# Patient Record
Sex: MALE | Race: WHITE | Employment: STUDENT | URBAN - METROPOLITAN AREA
[De-identification: names, ages, dates, MRNs, and addresses within clinical notes are randomized per-mention and may not be internally consistent; named-entity substitution may affect disease eponyms.]

---
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
## 2021-02-23 ENCOUNTER — OFFICE VISIT (OUTPATIENT)
Dept: FAMILY MEDICINE CLINIC | Facility: CLINIC | Age: 21
End: 2021-02-23
Payer: COMMERCIAL

## 2021-02-23 VITALS
RESPIRATION RATE: 16 BRPM | HEART RATE: 64 BPM | SYSTOLIC BLOOD PRESSURE: 112 MMHG | WEIGHT: 139 LBS | HEIGHT: 64.5 IN | TEMPERATURE: 97 F | BODY MASS INDEX: 23.44 KG/M2 | DIASTOLIC BLOOD PRESSURE: 70 MMHG | OXYGEN SATURATION: 99 %

## 2021-02-23 DIAGNOSIS — Z00.00 WELLNESS EXAMINATION: Primary | ICD-10-CM

## 2021-02-23 PROCEDURE — 3074F SYST BP LT 130 MM HG: CPT | Performed by: FAMILY MEDICINE

## 2021-02-23 PROCEDURE — 99395 PREV VISIT EST AGE 18-39: CPT | Performed by: FAMILY MEDICINE

## 2021-02-23 PROCEDURE — 3078F DIAST BP <80 MM HG: CPT | Performed by: FAMILY MEDICINE

## 2021-02-23 PROCEDURE — 3008F BODY MASS INDEX DOCD: CPT | Performed by: FAMILY MEDICINE

## 2021-02-23 NOTE — PROGRESS NOTES
Wellness Exam    REASON FOR VISIT:    Shahnaz Hartman is a 22year old male who presents for an [address]     Current Complaints: Mr. Carlos Hoffman is here for his wellness exam  Flu Shot: see immunization record  Health Maintenance Topics with d PREVENTATIVE SERVICES  INDICATIONS AND SCHEDULE Recommendation Internal Lab or Procedure External Lab or Procedure   Colonoscopy Screen Every 10 years There are no preventive care reminders to display for this patient.     Flex Sigmoidoscopy Screen  Morenita Family History   Problem Relation Age of Onset   • Heart Disorder Father         leaky aortic heart valve   • No Known Problems Mother       SOCIAL HISTORY:   Social History    Tobacco Use      Smoking status: Never Smoker      Smokeless tobacco: Never U rales.   Abdominal: Soft. Bowel sounds are normal. There is no tenderness. Musculoskeletal: Normal range of motion. He exhibits no edema. Lymphadenopathy:     He has no cervical adenopathy.    Neurological: He is alert and oriented to person, place, and History   Administered Date(s) Administered   • DTAP 04/13/2001, 06/15/2001, 08/14/2001, 04/04/2006   • FLULAVAL 6 months & older 0.5 ml Prefilled syringe (33075) 10/28/2019   • HEP A,Ped/Adol,(2 Dose) 04/04/2006, 02/15/2007   • HEP B, Ped/Adol 04/13/2001,

## 2021-02-23 NOTE — PATIENT INSTRUCTIONS
Thank you for choosing Isacc Frias MD at [place] [address]  To Do: Sarah Mccullough  1. Please see age appropriate health prevention below  [facility] is located in [address]. Monday, Tuesday & Friday – 8 a.m. to 4 p.m.   Wednesday, Thursd the benefits outweigh those potential risks and we strive to make you healthier and to improve your quality of life.     Referrals, and Radiology Information:    If your insurance requires a referral to a specialist, please allow 5 business days to process exams   Blood pressure All men in this age group Yearly checkup if your blood pressure is normal  Normal blood pressure is less than 120/80  If your blood pressure is higher than normal, follow the advice of your healthcare provider.     Diabetes mellitus, given 6 months after the first dose    Influenza (flu) All men in this age group Once a year   Measles, mumps, rubella (MMR) All men in this age group who have no record of these infections or vaccines 1 or 2 doses through age 54   Meningococcal [facility]

## 2021-04-06 ENCOUNTER — TELEPHONE (OUTPATIENT)
Dept: FAMILY MEDICINE CLINIC | Facility: CLINIC | Age: 21
End: 2021-04-06

## 2021-04-07 ENCOUNTER — OFFICE VISIT (OUTPATIENT)
Dept: FAMILY MEDICINE CLINIC | Facility: CLINIC | Age: 21
End: 2021-04-07
Payer: COMMERCIAL

## 2021-04-07 ENCOUNTER — HOSPITAL ENCOUNTER (OUTPATIENT)
Dept: ULTRASOUND IMAGING | Age: 21
Discharge: HOME | End: 2021-04-07
Attending: FAMILY MEDICINE
Payer: COMMERCIAL

## 2021-04-07 VITALS
RESPIRATION RATE: 14 BRPM | HEART RATE: 110 BPM | BODY MASS INDEX: 23.61 KG/M2 | SYSTOLIC BLOOD PRESSURE: 120 MMHG | HEIGHT: 64.5 IN | DIASTOLIC BLOOD PRESSURE: 70 MMHG | OXYGEN SATURATION: 98 % | TEMPERATURE: 98 F | WEIGHT: 140 LBS

## 2021-04-07 DIAGNOSIS — N50.811 PAIN IN RIGHT TESTICLE: ICD-10-CM

## 2021-04-07 DIAGNOSIS — N50.811 PAIN IN RIGHT TESTICLE: Primary | ICD-10-CM

## 2021-04-07 PROCEDURE — 3074F SYST BP LT 130 MM HG: CPT | Performed by: FAMILY MEDICINE

## 2021-04-07 PROCEDURE — 76870 US EXAM SCROTUM: CPT | Performed by: FAMILY MEDICINE

## 2021-04-07 PROCEDURE — 99213 OFFICE O/P EST LOW 20 MIN: CPT | Performed by: FAMILY MEDICINE

## 2021-04-07 PROCEDURE — 93975 VASCULAR STUDY: CPT | Performed by: FAMILY MEDICINE

## 2021-04-07 PROCEDURE — 3008F BODY MASS INDEX DOCD: CPT | Performed by: FAMILY MEDICINE

## 2021-04-07 PROCEDURE — 3078F DIAST BP <80 MM HG: CPT | Performed by: FAMILY MEDICINE

## 2021-04-07 NOTE — PROGRESS NOTES
HPI/Subjective:   Patient ID: Linda Galeana is a 22year old male. HPI  Ashley Kenney has been generally healthy here today for right testicular pain for a month. He described the pain as sharp and mild to moderate intensity and nonradiating.   This seem to mass or tenderness. Lymphadenopathy:      Lower Body: No right inguinal adenopathy. No left inguinal adenopathy. Neurological:      Mental Status: He is alert.          Assessment & Plan:   Pain in right testicle  (primary encounter diagnosis)  Uncl

## 2021-04-07 NOTE — PATIENT INSTRUCTIONS
Thank you for choosing Dolly Diaz MD at [place] [address]  To Do: Mario Anderson  1. Please have test as ordered  [facility] is located in [address]. Monday, Tuesday & Friday – 8 a.m. to 4 p.m. Wednesday, Thursday – 7 a.m. to 3 p.m. those potential risks and we strive to make you healthier and to improve your quality of life.     Referrals, and Radiology Information:    If your insurance requires a referral to a specialist, please allow 5 business days to process your referral request. all medicine as directed. The following are general care guidelines:  · To relieve pain and swelling, apply an ice pack wrapped in a thin towel for 10 minutes at a time. Continue this on and off for 1 to 2 days.   · When lying down, place a small rolled to

## 2021-04-28 ENCOUNTER — IMMUNIZATION (OUTPATIENT)
Dept: LAB | Age: 21
End: 2021-04-28
Attending: HOSPITALIST
Payer: COMMERCIAL

## 2021-04-28 DIAGNOSIS — Z23 NEED FOR VACCINATION: Primary | ICD-10-CM

## 2021-04-28 PROCEDURE — 0001A SARSCOV2 VAC 30MCG/0.3ML IM: CPT

## 2021-05-20 ENCOUNTER — IMMUNIZATION (OUTPATIENT)
Dept: LAB | Age: 21
End: 2021-05-20
Attending: HOSPITALIST
Payer: COMMERCIAL

## 2021-05-20 DIAGNOSIS — Z23 NEED FOR VACCINATION: Primary | ICD-10-CM

## 2021-05-20 PROCEDURE — 0002A SARSCOV2 VAC 30MCG/0.3ML IM: CPT

## 2021-06-07 RX ORDER — MONTELUKAST SODIUM 10 MG/1
10 TABLET ORAL NIGHTLY
Qty: 90 TABLET | Refills: 1 | Status: SHIPPED | OUTPATIENT
Start: 2021-06-07

## 2021-06-07 NOTE — TELEPHONE ENCOUNTER
Asthma & COPD Medication Protocol [id number]/07/2021 02:26 PM   Asthma Action Score greater than or equal to 20    AAP/ACT given in last 12 months    Appointment in past 6 or next 3 months      Refill protocol failed because the patient did not meet the prot

## 2021-09-07 ENCOUNTER — HOSPITAL ENCOUNTER (OUTPATIENT)
Facility: HOSPITAL | Age: 21
Setting detail: OBSERVATION
Discharge: HOME | End: 2021-09-07
Attending: HOSPITALIST | Admitting: HOSPITALIST
Payer: COMMERCIAL

## 2021-09-07 ENCOUNTER — ANESTHESIA (OUTPATIENT)
Dept: SURGERY | Facility: HOSPITAL | Age: 21
End: 2021-09-07
Payer: COMMERCIAL

## 2021-09-07 ENCOUNTER — HOSPITAL ENCOUNTER (OUTPATIENT)
Age: 21
Discharge: ANOTHER HOSPITAL | End: 2021-09-07
Payer: COMMERCIAL

## 2021-09-07 ENCOUNTER — APPOINTMENT (OUTPATIENT)
Dept: CT IMAGING | Age: 21
End: 2021-09-07
Attending: PHYSICIAN ASSISTANT
Payer: COMMERCIAL

## 2021-09-07 ENCOUNTER — ANESTHESIA EVENT (OUTPATIENT)
Dept: SURGERY | Facility: HOSPITAL | Age: 21
End: 2021-09-07
Payer: COMMERCIAL

## 2021-09-07 VITALS
HEIGHT: 65 IN | RESPIRATION RATE: 18 BRPM | WEIGHT: 137 LBS | DIASTOLIC BLOOD PRESSURE: 80 MMHG | HEART RATE: 85 BPM | OXYGEN SATURATION: 99 % | SYSTOLIC BLOOD PRESSURE: 135 MMHG | BODY MASS INDEX: 22.82 KG/M2 | TEMPERATURE: 98 F

## 2021-09-07 VITALS
TEMPERATURE: 99 F | OXYGEN SATURATION: 96 % | HEART RATE: 106 BPM | DIASTOLIC BLOOD PRESSURE: 70 MMHG | SYSTOLIC BLOOD PRESSURE: 131 MMHG | RESPIRATION RATE: 18 BRPM

## 2021-09-07 DIAGNOSIS — K35.80 ACUTE APPENDICITIS, UNSPECIFIED ACUTE APPENDICITIS TYPE: Primary | ICD-10-CM

## 2021-09-07 DIAGNOSIS — K37 APPENDICITIS: ICD-10-CM

## 2021-09-07 LAB
#MXD IC: 1.5 X10ˆ3/UL (ref 0.1–1)
BUN BLD-MCNC: 16 MG/DL (ref 7–18)
CHLORIDE BLD-SCNC: 104 MMOL/L (ref 98–112)
CO2 BLD-SCNC: 19 MMOL/L (ref 21–32)
CREAT BLD-MCNC: 1 MG/DL
GLUCOSE BLD-MCNC: 116 MG/DL (ref 70–99)
HCT VFR BLD AUTO: 47.9 %
HCT VFR BLD CALC: 49 %
HGB BLD-MCNC: 16.3 G/DL
ISTAT IONIZED CALCIUM FOR CHEM 8: 1.18 MMOL/L (ref 1.12–1.32)
LYMPHOCYTES # BLD AUTO: 1.8 X10ˆ3/UL (ref 1–4)
LYMPHOCYTES NFR BLD AUTO: 9.2 %
MCH RBC QN AUTO: 30.4 PG (ref 26–34)
MCHC RBC AUTO-ENTMCNC: 34 G/DL (ref 31–37)
MCV RBC AUTO: 89.2 FL (ref 80–100)
MIXED CELL %: 7.6 %
NEUTROPHILS # BLD AUTO: 16.3 X10ˆ3/UL (ref 1.5–7.7)
NEUTROPHILS NFR BLD AUTO: 83.2 %
POTASSIUM BLD-SCNC: 4 MMOL/L (ref 3.6–5.1)
RBC # BLD AUTO: 5.37 X10ˆ6/UL
SARS-COV-2 RNA RESP QL NAA+PROBE: NOT DETECTED
SODIUM BLD-SCNC: 139 MMOL/L (ref 136–145)
WBC # BLD AUTO: 19.6 X10ˆ3/UL (ref 4–11)

## 2021-09-07 PROCEDURE — 99244 OFF/OP CNSLTJ NEW/EST MOD 40: CPT | Performed by: STUDENT IN AN ORGANIZED HEALTH CARE EDUCATION/TRAINING PROGRAM

## 2021-09-07 PROCEDURE — 85025 COMPLETE CBC W/AUTO DIFF WBC: CPT | Performed by: PHYSICIAN ASSISTANT

## 2021-09-07 PROCEDURE — 99214 OFFICE O/P EST MOD 30 MIN: CPT

## 2021-09-07 PROCEDURE — 99205 OFFICE O/P NEW HI 60 MIN: CPT

## 2021-09-07 PROCEDURE — 44970 LAPAROSCOPY APPENDECTOMY: CPT | Performed by: STUDENT IN AN ORGANIZED HEALTH CARE EDUCATION/TRAINING PROGRAM

## 2021-09-07 PROCEDURE — 74177 CT ABD & PELVIS W/CONTRAST: CPT | Performed by: PHYSICIAN ASSISTANT

## 2021-09-07 PROCEDURE — 0DTJ4ZZ RESECTION OF APPENDIX, PERCUTANEOUS ENDOSCOPIC APPROACH: ICD-10-PCS | Performed by: STUDENT IN AN ORGANIZED HEALTH CARE EDUCATION/TRAINING PROGRAM

## 2021-09-07 PROCEDURE — 80047 BASIC METABLC PNL IONIZED CA: CPT

## 2021-09-07 PROCEDURE — 96374 THER/PROPH/DIAG INJ IV PUSH: CPT

## 2021-09-07 PROCEDURE — 99215 OFFICE O/P EST HI 40 MIN: CPT

## 2021-09-07 PROCEDURE — 96360 HYDRATION IV INFUSION INIT: CPT

## 2021-09-07 RX ORDER — LIDOCAINE HYDROCHLORIDE 10 MG/ML
INJECTION, SOLUTION EPIDURAL; INFILTRATION; INTRACAUDAL; PERINEURAL AS NEEDED
Status: DISCONTINUED | OUTPATIENT
Start: 2021-09-07 | End: 2021-09-07 | Stop reason: SURG

## 2021-09-07 RX ORDER — SENNA AND DOCUSATE SODIUM 50; 8.6 MG/1; MG/1
1 TABLET, FILM COATED ORAL DAILY
Qty: 30 TABLET | Refills: 0 | Status: SHIPPED | OUTPATIENT
Start: 2021-09-07

## 2021-09-07 RX ORDER — HYDROMORPHONE HYDROCHLORIDE 1 MG/ML
INJECTION, SOLUTION INTRAMUSCULAR; INTRAVENOUS; SUBCUTANEOUS
Status: DISCONTINUED
Start: 2021-09-07 | End: 2021-09-07

## 2021-09-07 RX ORDER — MIDAZOLAM HYDROCHLORIDE 1 MG/ML
INJECTION INTRAMUSCULAR; INTRAVENOUS AS NEEDED
Status: DISCONTINUED | OUTPATIENT
Start: 2021-09-07 | End: 2021-09-07 | Stop reason: SURG

## 2021-09-07 RX ORDER — ACETAMINOPHEN 500 MG
1000 TABLET ORAL ONCE AS NEEDED
Status: CANCELLED | OUTPATIENT
Start: 2021-09-07 | End: 2021-09-07

## 2021-09-07 RX ORDER — ONDANSETRON 4 MG/1
4 TABLET, ORALLY DISINTEGRATING ORAL EVERY 8 HOURS PRN
Status: DISCONTINUED | OUTPATIENT
Start: 2021-09-07 | End: 2021-09-07

## 2021-09-07 RX ORDER — HYDROMORPHONE HYDROCHLORIDE 1 MG/ML
0.4 INJECTION, SOLUTION INTRAMUSCULAR; INTRAVENOUS; SUBCUTANEOUS EVERY 2 HOUR PRN
Status: DISCONTINUED | OUTPATIENT
Start: 2021-09-07 | End: 2021-09-07

## 2021-09-07 RX ORDER — IBUPROFEN 800 MG/1
800 TABLET ORAL EVERY 6 HOURS PRN
Status: DISCONTINUED | OUTPATIENT
Start: 2021-09-07 | End: 2021-09-07

## 2021-09-07 RX ORDER — MEPERIDINE HYDROCHLORIDE 25 MG/ML
12.5 INJECTION INTRAMUSCULAR; INTRAVENOUS; SUBCUTANEOUS AS NEEDED
Status: CANCELLED | OUTPATIENT
Start: 2021-09-07

## 2021-09-07 RX ORDER — DEXAMETHASONE SODIUM PHOSPHATE 4 MG/ML
VIAL (ML) INJECTION AS NEEDED
Status: DISCONTINUED | OUTPATIENT
Start: 2021-09-07 | End: 2021-09-07 | Stop reason: SURG

## 2021-09-07 RX ORDER — SODIUM CHLORIDE, SODIUM LACTATE, POTASSIUM CHLORIDE, CALCIUM CHLORIDE 600; 310; 30; 20 MG/100ML; MG/100ML; MG/100ML; MG/100ML
INJECTION, SOLUTION INTRAVENOUS CONTINUOUS
Status: CANCELLED | OUTPATIENT
Start: 2021-09-07

## 2021-09-07 RX ORDER — BUPIVACAINE HYDROCHLORIDE AND EPINEPHRINE 5; 5 MG/ML; UG/ML
INJECTION, SOLUTION EPIDURAL; INTRACAUDAL; PERINEURAL AS NEEDED
Status: DISCONTINUED | OUTPATIENT
Start: 2021-09-07 | End: 2021-09-07 | Stop reason: HOSPADM

## 2021-09-07 RX ORDER — KETOROLAC TROMETHAMINE 30 MG/ML
INJECTION, SOLUTION INTRAMUSCULAR; INTRAVENOUS AS NEEDED
Status: DISCONTINUED | OUTPATIENT
Start: 2021-09-07 | End: 2021-09-07 | Stop reason: SURG

## 2021-09-07 RX ORDER — ROCURONIUM BROMIDE 10 MG/ML
INJECTION, SOLUTION INTRAVENOUS AS NEEDED
Status: DISCONTINUED | OUTPATIENT
Start: 2021-09-07 | End: 2021-09-07 | Stop reason: SURG

## 2021-09-07 RX ORDER — OXYCODONE HYDROCHLORIDE 5 MG/1
5 TABLET ORAL EVERY 4 HOURS PRN
Status: DISCONTINUED | OUTPATIENT
Start: 2021-09-07 | End: 2021-09-07

## 2021-09-07 RX ORDER — HYDROCODONE BITARTRATE AND ACETAMINOPHEN 5; 325 MG/1; MG/1
1 TABLET ORAL AS NEEDED
Status: CANCELLED | OUTPATIENT
Start: 2021-09-07

## 2021-09-07 RX ORDER — SODIUM CHLORIDE 9 MG/ML
1000 INJECTION, SOLUTION INTRAVENOUS ONCE
Status: COMPLETED | OUTPATIENT
Start: 2021-09-07 | End: 2021-09-07

## 2021-09-07 RX ORDER — KETOROLAC TROMETHAMINE 30 MG/ML
15 INJECTION, SOLUTION INTRAMUSCULAR; INTRAVENOUS ONCE
Status: DISCONTINUED | OUTPATIENT
Start: 2021-09-07 | End: 2021-09-07

## 2021-09-07 RX ORDER — PHENYLEPHRINE HCL 10 MG/ML
VIAL (ML) INJECTION AS NEEDED
Status: DISCONTINUED | OUTPATIENT
Start: 2021-09-07 | End: 2021-09-07 | Stop reason: SURG

## 2021-09-07 RX ORDER — ONDANSETRON 2 MG/ML
INJECTION INTRAMUSCULAR; INTRAVENOUS AS NEEDED
Status: DISCONTINUED | OUTPATIENT
Start: 2021-09-07 | End: 2021-09-07 | Stop reason: SURG

## 2021-09-07 RX ORDER — CEFAZOLIN SODIUM/WATER 2 G/20 ML
2 SYRINGE (ML) INTRAVENOUS ONCE
Status: CANCELLED | OUTPATIENT
Start: 2021-09-07 | End: 2021-09-07

## 2021-09-07 RX ORDER — SODIUM CHLORIDE 9 MG/ML
INJECTION, SOLUTION INTRAVENOUS CONTINUOUS
Status: DISCONTINUED | OUTPATIENT
Start: 2021-09-07 | End: 2021-09-07

## 2021-09-07 RX ORDER — HYDROMORPHONE HYDROCHLORIDE 1 MG/ML
0.4 INJECTION, SOLUTION INTRAMUSCULAR; INTRAVENOUS; SUBCUTANEOUS EVERY 5 MIN PRN
Status: CANCELLED | OUTPATIENT
Start: 2021-09-07 | End: 2021-09-08

## 2021-09-07 RX ORDER — HYDROCODONE BITARTRATE AND ACETAMINOPHEN 5; 325 MG/1; MG/1
2 TABLET ORAL AS NEEDED
Status: CANCELLED | OUTPATIENT
Start: 2021-09-07

## 2021-09-07 RX ORDER — METOCLOPRAMIDE HYDROCHLORIDE 5 MG/ML
INJECTION INTRAMUSCULAR; INTRAVENOUS AS NEEDED
Status: DISCONTINUED | OUTPATIENT
Start: 2021-09-07 | End: 2021-09-07 | Stop reason: SURG

## 2021-09-07 RX ORDER — ONDANSETRON 2 MG/ML
4 INJECTION INTRAMUSCULAR; INTRAVENOUS ONCE
Status: COMPLETED | OUTPATIENT
Start: 2021-09-07 | End: 2021-09-07

## 2021-09-07 RX ORDER — HEPARIN SODIUM 5000 [USP'U]/ML
5000 INJECTION, SOLUTION INTRAVENOUS; SUBCUTANEOUS ONCE
Status: COMPLETED | OUTPATIENT
Start: 2021-09-07 | End: 2021-09-07

## 2021-09-07 RX ORDER — HYDROMORPHONE HYDROCHLORIDE 1 MG/ML
0.2 INJECTION, SOLUTION INTRAMUSCULAR; INTRAVENOUS; SUBCUTANEOUS EVERY 2 HOUR PRN
Status: DISCONTINUED | OUTPATIENT
Start: 2021-09-07 | End: 2021-09-07

## 2021-09-07 RX ORDER — OXYCODONE HYDROCHLORIDE 5 MG/1
5 TABLET ORAL EVERY 6 HOURS PRN
Qty: 10 TABLET | Refills: 0 | Status: SHIPPED | OUTPATIENT
Start: 2021-09-07

## 2021-09-07 RX ORDER — ACETAMINOPHEN 325 MG/1
650 TABLET ORAL EVERY 6 HOURS PRN
Status: DISCONTINUED | OUTPATIENT
Start: 2021-09-07 | End: 2021-09-07

## 2021-09-07 RX ORDER — NALOXONE HYDROCHLORIDE 0.4 MG/ML
80 INJECTION, SOLUTION INTRAMUSCULAR; INTRAVENOUS; SUBCUTANEOUS AS NEEDED
Status: CANCELLED | OUTPATIENT
Start: 2021-09-07 | End: 2021-09-08

## 2021-09-07 RX ORDER — CEFOXITIN 2 G/1
INJECTION, POWDER, FOR SOLUTION INTRAVENOUS AS NEEDED
Status: DISCONTINUED | OUTPATIENT
Start: 2021-09-07 | End: 2021-09-07 | Stop reason: SURG

## 2021-09-07 RX ORDER — HYDROMORPHONE HYDROCHLORIDE 1 MG/ML
0.1 INJECTION, SOLUTION INTRAMUSCULAR; INTRAVENOUS; SUBCUTANEOUS EVERY 2 HOUR PRN
Status: DISCONTINUED | OUTPATIENT
Start: 2021-09-07 | End: 2021-09-07

## 2021-09-07 RX ADMIN — MIDAZOLAM HYDROCHLORIDE 2 MG: 1 INJECTION INTRAMUSCULAR; INTRAVENOUS at 16:29:00

## 2021-09-07 RX ADMIN — DEXAMETHASONE SODIUM PHOSPHATE 4 MG: 4 MG/ML VIAL (ML) INJECTION at 16:40:00

## 2021-09-07 RX ADMIN — ROCURONIUM BROMIDE 50 MG: 10 INJECTION, SOLUTION INTRAVENOUS at 16:34:00

## 2021-09-07 RX ADMIN — METOCLOPRAMIDE HYDROCHLORIDE 10 MG: 5 INJECTION INTRAMUSCULAR; INTRAVENOUS at 16:40:00

## 2021-09-07 RX ADMIN — KETOROLAC TROMETHAMINE 30 MG: 30 INJECTION, SOLUTION INTRAMUSCULAR; INTRAVENOUS at 17:25:00

## 2021-09-07 RX ADMIN — ONDANSETRON 4 MG: 2 INJECTION INTRAMUSCULAR; INTRAVENOUS at 16:40:00

## 2021-09-07 RX ADMIN — CEFOXITIN 2 G: 2 INJECTION, POWDER, FOR SOLUTION INTRAVENOUS at 16:40:00

## 2021-09-07 RX ADMIN — LIDOCAINE HYDROCHLORIDE 50 MG: 10 INJECTION, SOLUTION EPIDURAL; INFILTRATION; INTRACAUDAL; PERINEURAL at 16:29:00

## 2021-09-07 RX ADMIN — SODIUM CHLORIDE: 9 INJECTION, SOLUTION INTRAVENOUS at 17:39:00

## 2021-09-07 RX ADMIN — PHENYLEPHRINE HCL 50 MCG: 10 MG/ML VIAL (ML) INJECTION at 16:46:00

## 2021-09-07 NOTE — ANESTHESIA PREPROCEDURE EVALUATION
PRE-OP EVALUATION    Patient Name: Michael Rodríguez    Admit Diagnosis: Appendicitis [K37]    Pre-op Diagnosis: Appendicitis [K37]    LAPAROSCOPIC APPENDECTOMY    Anesthesia Procedure: LAPAROSCOPIC APPENDECTOMY (N/A Abdomen)    Surgeon(s) and Role:     * S Endo/Other    Negative endo/other ROS. Pulmonary      (+) asthma                     Neuro/Psych    Negative neuro/psych ROS. No past surgical history on file.   Social History

## 2021-09-07 NOTE — ANESTHESIA PROCEDURE NOTES
Airway  Date/Time: 9/7/2021 4:36 PM  Urgency: elective      General Information and Staff    Patient location during procedure: OR  Anesthesiologist: Oscar Farrar MD  Performed: anesthesiologist     Indications and Patient Condition  Indications f

## 2021-09-07 NOTE — PROGRESS NOTES
NURSING ADMISSION NOTE      Patient admitted via Wheelchair  Oriented to room. Safety precautions initiated. Bed in low position. Call light in reach. A&O x4. Denies any CP, CAROLYNN, or calf pain at present. Lungs clear bilaterally.  Abdomen soft, tender

## 2021-09-07 NOTE — ED INITIAL ASSESSMENT (HPI)
Co abd pain LLQ starting yesterday, has moved to almost entire abd. Pain with palpitation. Pt is tearful.   Nausea and vomiting

## 2021-09-07 NOTE — ANESTHESIA POSTPROCEDURE EVALUATION
Brokasi Patient Status:  Observation   Age/Gender 22year old male MRN [id number]   [facility] SURGERY Attending Melchor Moe, [address] Day # 0 PCP Jan Calderon MD       Anesthesia Post-op Note    Yevgeniy Ban

## 2021-09-07 NOTE — ED PROVIDER NOTES
Patient Seen in: [facility]      History   Patient presents with:  Abdomen/Flank Pain    Stated Complaint: abdominal pain    HPI/Subjective:   HPI    26-year-old male here with complaint of disseminated abdominal pain that started last ni Nose: Nose normal.   Eyes:      Extraocular Movements: Extraocular movements intact. Conjunctiva/sclera: Conjunctivae normal.      Pupils: Pupils are equal, round, and reactive to light.    Cardiovascular:      Rate and Rhythm: Normal rate and regular intravenous contrast material. Post contrast coronal MPR imaging was performed. Dose reduction techniques were used.  Dose information is transmitted to the [facility] ([address] [place] [address] of Radiology) Edwin Tariq [address] ([address] [facility] [address]) which includes the Dose Clinical correlation recommended. NOTE: Patient is stable for transport and will be driven by his father  NOTE: Spoke to general surgeon Dr. Katya Connolly. Patient will go directly to surgery.   I also contacted the transfer center who stated he should enter

## 2021-09-07 NOTE — OPERATIVE REPORT
General Surgery Operative Note  Erven Boots Location: OR   CSN [id number] MRN [id number]    2000 Age 22year old   Admission Date 2021 Operation Date 2021   Attending Physician Chasity Gonzales, [place] Operating Physician Chasity Garcia anesthesia team and placed in the supine position. Left arm was tucked and general anesthesia was induced. SCDs were placed. Perioperative antibiotics and DVT prophylaxis were administered according to hospital protocol.  A comprehensive time-out was per operating field pending pathology. The abdominal cavity and pelvis were then extensively irrigated and the irrigation solution suctioned out.    The left lower quadrant port site fascia closed with 0 vicryl suture in figure-of-eight fashion using Endo Close

## 2021-09-07 NOTE — H&P
[facility]  Report of  Surgical Consultation with History and Physical Exam    Camila Varela Patient Status:  Observation    2000 MRN [id number]   [facility] 3NW-A Attending Jessica Santos MD   Hosp Day # 0 PCP Angeli Tang, HCl (DILAUDID) 1 MG/ML injection 0.4 mg, 0.4 mg, Intravenous, Q2H PRN  •  0.9% NaCl infusion, , Intravenous, Continuous  •  HYDROmorphone HCl (DILAUDID) 1 MG/ML injection, , ,     Review of Systems:  Pertinent items are noted in HPI.     Allergic/Immuno:  Renita Gavin right lower quadrant, no signs of peritonitis, spleen nonpalpable    Extremities:  No lower extremity edema noted. Without clubbing or cyanosis. Skin: Normal texture and turgor.       Laboratory Data and Relevant X-rays:  Recent Labs   Lab 09/07/21  1 rate  Pulm: respirations unlabored  Abdomen: Soft, non-distended, moderate right lower quadrant and right mid upper quadrant tenderness, no rebound or guarding, no peritoneal signs, no hernias or surgical scars.   Extrem: No C/C/E        A/P  22year old ma

## 2021-09-07 NOTE — PROGRESS NOTES
1840: Pt back from PACU in stable condition. VSS and afebrile. Denies any pain or nausea at this time. Abdomen soft, flat, nondistended. Laps x3 c/d/i with skin glue. D/t void. POC discussed with pt/family who verbalize understanding.  Pt resting in bed, ca

## 2021-09-08 LAB
BASOPHILS # BLD AUTO: 0.04 X10(3) UL (ref 0–0.2)
BASOPHILS NFR BLD AUTO: 0.2 %
EOSINOPHIL # BLD AUTO: 0.01 X10(3) UL (ref 0–0.7)
EOSINOPHIL NFR BLD AUTO: 0.1 %
ERYTHROCYTE [DISTWIDTH] IN BLOOD BY AUTOMATED COUNT: 12.5 %
HCT VFR BLD AUTO: 51.5 %
HGB BLD-MCNC: 16.8 G/DL
IMM GRANULOCYTES # BLD AUTO: 0.07 X10(3) UL (ref 0–1)
IMM GRANULOCYTES NFR BLD: 0.4 %
LYMPHOCYTES # BLD AUTO: 2.02 X10(3) UL (ref 1–4)
LYMPHOCYTES NFR BLD AUTO: 10.1 %
MCH RBC QN AUTO: 30.5 PG (ref 26–34)
MCHC RBC AUTO-ENTMCNC: 32.6 G/DL (ref 31–37)
MCV RBC AUTO: 93.5 FL
MONOCYTES # BLD AUTO: 1.27 X10(3) UL (ref 0.1–1)
MONOCYTES NFR BLD AUTO: 6.4 %
NEUTROPHILS # BLD AUTO: 16.56 X10 (3) UL (ref 1.5–7.7)
NEUTROPHILS # BLD AUTO: 16.56 X10(3) UL (ref 1.5–7.7)
NEUTROPHILS NFR BLD AUTO: 82.8 %
PLATELET # BLD AUTO: 313 10(3)UL (ref 150–450)
RBC # BLD AUTO: 5.51 X10(6)UL
WBC # BLD AUTO: 20 X10(3) UL (ref 4–11)

## 2021-09-14 ENCOUNTER — OFFICE VISIT (OUTPATIENT)
Dept: SURGERY | Facility: CLINIC | Age: 21
End: 2021-09-14

## 2021-09-14 VITALS — WEIGHT: 106 LBS | TEMPERATURE: 96 F | BODY MASS INDEX: 17.04 KG/M2 | HEIGHT: 66 IN

## 2021-09-14 DIAGNOSIS — Z90.49 HISTORY OF APPENDECTOMY: Primary | ICD-10-CM

## 2021-09-14 PROCEDURE — 99024 POSTOP FOLLOW-UP VISIT: CPT | Performed by: PHYSICIAN ASSISTANT

## 2021-09-14 PROCEDURE — 3008F BODY MASS INDEX DOCD: CPT | Performed by: PHYSICIAN ASSISTANT

## 2021-09-14 NOTE — PROGRESS NOTES
Follow Up Visit Note       Active Problems      1. History of appendectomy          Chief Complaint   Patient presents with:  Post-Op: PO 9/7 lap flora Ibarra.  pt states pain is at a 3. he denies nausea,vomiting or fever        History of Present Illness Soln, Inhale 2 puffs into the lungs every 4 (four) hours as needed for Wheezing or Shortness of Breath., Disp: 1 Inhaler, Rfl: 0     Review of Systems  The Review of Systems has been reviewed by me during today.   Review of Systems   Constitutional: James Rodriges appendectomy  (primary encounter diagnosis)    Plan   1. The patient is doing well following laparoscopic appendectomy. 2. Surgical pathology was discussed with the patient.   3. He is to continue with lifting restrictions of no more than 20 pounds for 4 w

## 2021-10-26 ENCOUNTER — OFFICE VISIT (OUTPATIENT)
Dept: SURGERY | Facility: CLINIC | Age: 21
End: 2021-10-26

## 2021-10-26 VITALS
SYSTOLIC BLOOD PRESSURE: 131 MMHG | HEIGHT: 66 IN | BODY MASS INDEX: 22.02 KG/M2 | WEIGHT: 137 LBS | DIASTOLIC BLOOD PRESSURE: 70 MMHG | TEMPERATURE: 98 F

## 2021-10-26 DIAGNOSIS — Z90.49 HISTORY OF APPENDECTOMY: Primary | ICD-10-CM

## 2021-10-26 PROCEDURE — 99024 POSTOP FOLLOW-UP VISIT: CPT | Performed by: PHYSICIAN ASSISTANT

## 2021-10-26 PROCEDURE — 3008F BODY MASS INDEX DOCD: CPT | Performed by: PHYSICIAN ASSISTANT

## 2021-10-26 PROCEDURE — 3078F DIAST BP <80 MM HG: CPT | Performed by: PHYSICIAN ASSISTANT

## 2021-10-26 PROCEDURE — 3075F SYST BP GE 130 - 139MM HG: CPT | Performed by: PHYSICIAN ASSISTANT

## 2021-10-26 NOTE — PROGRESS NOTES
Post Operative Visit Note       Active Problems  1. History of appendectomy         Chief Complaint   Patient presents with:  Post-Op: PO- to discuss RTW note appey done w/TOMAS 9/7/21- PT. denies any new concerns. PT. states needs a note for work. PT.  Shara Aroracel by mouth every 6 (six) hours as needed for Pain. (Patient not taking: Reported on 10/26/2021), Disp: 10 tablet, Rfl: 0      Review of Systems  The Review of Systems has been reviewed by me during today.   Review of Systems   Constitutional: Negative for chi appendectomy  (primary encounter diagnosis)      Plan   Patient is doing well following laparoscopic appendectomy. He is to continue with diet as tolerated. He is to continue with activity as tolerated.   He is to return to work on 10/26/2021 without rest

## 2022-08-15 ENCOUNTER — OFFICE VISIT (OUTPATIENT)
Dept: FAMILY MEDICINE CLINIC | Facility: CLINIC | Age: 22
End: 2022-08-15
Payer: COMMERCIAL

## 2022-08-15 VITALS
WEIGHT: 148.5 LBS | HEIGHT: 66 IN | HEART RATE: 59 BPM | DIASTOLIC BLOOD PRESSURE: 70 MMHG | BODY MASS INDEX: 23.87 KG/M2 | SYSTOLIC BLOOD PRESSURE: 110 MMHG | TEMPERATURE: 98 F | OXYGEN SATURATION: 98 % | RESPIRATION RATE: 16 BRPM

## 2022-08-15 DIAGNOSIS — R41.840 DIFFICULTY CONCENTRATING: ICD-10-CM

## 2022-08-15 DIAGNOSIS — Z00.00 WELLNESS EXAMINATION: Primary | ICD-10-CM

## 2022-11-22 ENCOUNTER — OFFICE VISIT (OUTPATIENT)
Dept: FAMILY MEDICINE CLINIC | Facility: CLINIC | Age: 22
End: 2022-11-22
Payer: COMMERCIAL

## 2022-11-22 VITALS
BODY MASS INDEX: 22.5 KG/M2 | HEIGHT: 66 IN | DIASTOLIC BLOOD PRESSURE: 80 MMHG | TEMPERATURE: 100 F | HEART RATE: 104 BPM | SYSTOLIC BLOOD PRESSURE: 128 MMHG | OXYGEN SATURATION: 98 % | WEIGHT: 140 LBS | RESPIRATION RATE: 18 BRPM

## 2022-11-22 DIAGNOSIS — Z87.09 HISTORY OF ASTHMA: ICD-10-CM

## 2022-11-22 DIAGNOSIS — R68.89 FLU-LIKE SYMPTOMS: Primary | ICD-10-CM

## 2022-11-22 PROCEDURE — 3008F BODY MASS INDEX DOCD: CPT | Performed by: NURSE PRACTITIONER

## 2022-11-22 PROCEDURE — 3079F DIAST BP 80-89 MM HG: CPT | Performed by: NURSE PRACTITIONER

## 2022-11-22 PROCEDURE — 99213 OFFICE O/P EST LOW 20 MIN: CPT | Performed by: NURSE PRACTITIONER

## 2022-11-22 PROCEDURE — 3074F SYST BP LT 130 MM HG: CPT | Performed by: NURSE PRACTITIONER

## 2022-11-22 PROCEDURE — 87637 SARSCOV2&INF A&B&RSV AMP PRB: CPT | Performed by: NURSE PRACTITIONER

## 2022-11-22 RX ORDER — ALBUTEROL SULFATE 90 UG/1
2 AEROSOL, METERED RESPIRATORY (INHALATION) EVERY 4 HOURS PRN
Qty: 1 EACH | Refills: 0 | Status: SHIPPED | OUTPATIENT
Start: 2022-11-22

## 2022-11-22 RX ORDER — OSELTAMIVIR PHOSPHATE 75 MG/1
75 CAPSULE ORAL 2 TIMES DAILY
Qty: 10 CAPSULE | Refills: 0 | Status: SHIPPED | OUTPATIENT
Start: 2022-11-22 | End: 2022-11-27

## 2022-11-23 LAB
FLUAV + FLUBV RNA SPEC NAA+PROBE: DETECTED
FLUAV + FLUBV RNA SPEC NAA+PROBE: NOT DETECTED
RSV RNA SPEC NAA+PROBE: NOT DETECTED
SARS-COV-2 RNA RESP QL NAA+PROBE: NOT DETECTED

## 2023-03-19 ENCOUNTER — OFFICE VISIT (OUTPATIENT)
Dept: FAMILY MEDICINE CLINIC | Facility: CLINIC | Age: 23
End: 2023-03-19
Payer: COMMERCIAL

## 2023-03-19 VITALS
HEIGHT: 65 IN | OXYGEN SATURATION: 98 % | RESPIRATION RATE: 18 BRPM | BODY MASS INDEX: 24.16 KG/M2 | HEART RATE: 74 BPM | WEIGHT: 145 LBS | DIASTOLIC BLOOD PRESSURE: 64 MMHG | TEMPERATURE: 99 F | SYSTOLIC BLOOD PRESSURE: 131 MMHG

## 2023-03-19 DIAGNOSIS — R50.9 FEVER, UNSPECIFIED FEVER CAUSE: ICD-10-CM

## 2023-03-19 DIAGNOSIS — J02.9 SORE THROAT: Primary | ICD-10-CM

## 2023-03-19 LAB
CONTROL LINE PRESENT WITH A CLEAR BACKGROUND (YES/NO): YES YES/NO
KIT LOT #: NORMAL NUMERIC
STREP GRP A CUL-SCR: NEGATIVE

## 2023-03-19 PROCEDURE — 87081 CULTURE SCREEN ONLY: CPT | Performed by: NURSE PRACTITIONER

## 2023-03-19 PROCEDURE — 87637 SARSCOV2&INF A&B&RSV AMP PRB: CPT | Performed by: NURSE PRACTITIONER

## 2023-03-19 RX ORDER — MONTELUKAST SODIUM 10 MG/1
10 TABLET ORAL NIGHTLY
Qty: 90 TABLET | Refills: 1 | Status: SHIPPED | OUTPATIENT
Start: 2023-03-19

## 2023-03-20 LAB
FLUAV + FLUBV RNA SPEC NAA+PROBE: NOT DETECTED
FLUAV + FLUBV RNA SPEC NAA+PROBE: NOT DETECTED
RSV RNA SPEC NAA+PROBE: NOT DETECTED
SARS-COV-2 RNA RESP QL NAA+PROBE: NOT DETECTED

## 2023-09-05 RX ORDER — MONTELUKAST SODIUM 10 MG/1
10 TABLET ORAL NIGHTLY
Qty: 90 TABLET | Refills: 1 | Status: SHIPPED | OUTPATIENT
Start: 2023-09-05

## 2023-10-11 ENCOUNTER — OFFICE VISIT (OUTPATIENT)
Dept: FAMILY MEDICINE CLINIC | Facility: CLINIC | Age: 23
End: 2023-10-11
Payer: COMMERCIAL

## 2023-10-11 VITALS
TEMPERATURE: 98 F | BODY MASS INDEX: 25.83 KG/M2 | DIASTOLIC BLOOD PRESSURE: 86 MMHG | HEIGHT: 65 IN | RESPIRATION RATE: 18 BRPM | SYSTOLIC BLOOD PRESSURE: 104 MMHG | OXYGEN SATURATION: 98 % | WEIGHT: 155 LBS | HEART RATE: 71 BPM

## 2023-10-11 DIAGNOSIS — H66.001 NON-RECURRENT ACUTE SUPPURATIVE OTITIS MEDIA OF RIGHT EAR WITHOUT SPONTANEOUS RUPTURE OF TYMPANIC MEMBRANE: Primary | ICD-10-CM

## 2023-10-11 PROCEDURE — 3079F DIAST BP 80-89 MM HG: CPT | Performed by: FAMILY MEDICINE

## 2023-10-11 PROCEDURE — 3008F BODY MASS INDEX DOCD: CPT | Performed by: FAMILY MEDICINE

## 2023-10-11 PROCEDURE — 99213 OFFICE O/P EST LOW 20 MIN: CPT | Performed by: FAMILY MEDICINE

## 2023-10-11 PROCEDURE — 3074F SYST BP LT 130 MM HG: CPT | Performed by: FAMILY MEDICINE

## 2023-10-11 RX ORDER — AMOXICILLIN 875 MG/1
875 TABLET, COATED ORAL 2 TIMES DAILY
Qty: 20 TABLET | Refills: 0 | Status: SHIPPED | OUTPATIENT
Start: 2023-10-11

## 2023-10-21 ENCOUNTER — OFFICE VISIT (OUTPATIENT)
Dept: FAMILY MEDICINE CLINIC | Facility: CLINIC | Age: 23
End: 2023-10-21
Payer: COMMERCIAL

## 2023-10-21 ENCOUNTER — HOSPITAL ENCOUNTER (EMERGENCY)
Age: 23
Discharge: HOME | End: 2023-10-21
Attending: EMERGENCY MEDICINE
Payer: COMMERCIAL

## 2023-10-21 VITALS
DIASTOLIC BLOOD PRESSURE: 96 MMHG | HEART RATE: 59 BPM | HEIGHT: 65 IN | TEMPERATURE: 97 F | SYSTOLIC BLOOD PRESSURE: 130 MMHG | WEIGHT: 152 LBS | BODY MASS INDEX: 25.33 KG/M2 | RESPIRATION RATE: 16 BRPM | OXYGEN SATURATION: 97 %

## 2023-10-21 VITALS
OXYGEN SATURATION: 95 % | RESPIRATION RATE: 16 BRPM | DIASTOLIC BLOOD PRESSURE: 65 MMHG | WEIGHT: 155 LBS | TEMPERATURE: 99 F | SYSTOLIC BLOOD PRESSURE: 111 MMHG | HEART RATE: 68 BPM | BODY MASS INDEX: 26 KG/M2

## 2023-10-21 DIAGNOSIS — S05.01XA ABRASION OF RIGHT CORNEA, INITIAL ENCOUNTER: Primary | ICD-10-CM

## 2023-10-21 DIAGNOSIS — H57.11 PAIN OF RIGHT EYE: Primary | ICD-10-CM

## 2023-10-21 PROCEDURE — 99214 OFFICE O/P EST MOD 30 MIN: CPT | Performed by: PHYSICIAN ASSISTANT

## 2023-10-21 PROCEDURE — 99283 EMERGENCY DEPT VISIT LOW MDM: CPT

## 2023-10-21 PROCEDURE — 3075F SYST BP GE 130 - 139MM HG: CPT | Performed by: PHYSICIAN ASSISTANT

## 2023-10-21 PROCEDURE — 3008F BODY MASS INDEX DOCD: CPT | Performed by: PHYSICIAN ASSISTANT

## 2023-10-21 PROCEDURE — 3080F DIAST BP >= 90 MM HG: CPT | Performed by: PHYSICIAN ASSISTANT

## 2023-10-21 RX ORDER — OFLOXACIN 3 MG/ML
2 SOLUTION/ DROPS OPHTHALMIC 4 TIMES DAILY
Qty: 5 ML | Refills: 0 | Status: SHIPPED | OUTPATIENT
Start: 2023-10-21 | End: 2023-10-28

## 2023-10-21 RX ORDER — TETRACAINE HYDROCHLORIDE 5 MG/ML
1 SOLUTION OPHTHALMIC ONCE
Status: COMPLETED | OUTPATIENT
Start: 2023-10-21 | End: 2023-10-21

## 2023-10-21 NOTE — DISCHARGE INSTRUCTIONS
Do not wear contacts until your eye has healed completely    You should follow-up with ophthalmology in 24 to 48 hours    Use eyedrops as prescribed    You should return to the emergency department with any worsening symptoms or concerns

## 2024-01-15 RX ORDER — ATOMOXETINE 10 MG/1
10 CAPSULE ORAL DAILY
Qty: 30 CAPSULE | Refills: 0 | Status: SHIPPED | OUTPATIENT
Start: 2024-01-15

## 2024-02-19 RX ORDER — ATOMOXETINE 10 MG/1
10 CAPSULE ORAL DAILY
Qty: 30 CAPSULE | Refills: 0 | Status: SHIPPED | OUTPATIENT
Start: 2024-02-19

## 2024-03-31 RX ORDER — ATOMOXETINE 10 MG/1
10 CAPSULE ORAL DAILY
Qty: 30 CAPSULE | Refills: 0 | Status: SHIPPED | OUTPATIENT
Start: 2024-03-31

## 2024-04-03 ENCOUNTER — OFFICE VISIT (OUTPATIENT)
Dept: FAMILY MEDICINE CLINIC | Facility: CLINIC | Age: 24
End: 2024-04-03
Payer: COMMERCIAL

## 2024-04-03 VITALS
HEIGHT: 65 IN | HEART RATE: 84 BPM | BODY MASS INDEX: 24.99 KG/M2 | RESPIRATION RATE: 16 BRPM | TEMPERATURE: 98 F | SYSTOLIC BLOOD PRESSURE: 120 MMHG | DIASTOLIC BLOOD PRESSURE: 66 MMHG | OXYGEN SATURATION: 95 % | WEIGHT: 150 LBS

## 2024-04-03 DIAGNOSIS — Z11.52 ENCOUNTER FOR SCREENING FOR COVID-19: ICD-10-CM

## 2024-04-03 DIAGNOSIS — J02.9 SORE THROAT: Primary | ICD-10-CM

## 2024-04-03 LAB
CONTROL LINE PRESENT WITH A CLEAR BACKGROUND (YES/NO): YES YES/NO
KIT LOT #: NORMAL NUMERIC

## 2024-04-03 PROCEDURE — 87635 SARS-COV-2 COVID-19 AMP PRB: CPT | Performed by: FAMILY MEDICINE

## 2024-04-03 PROCEDURE — 3074F SYST BP LT 130 MM HG: CPT | Performed by: FAMILY MEDICINE

## 2024-04-03 PROCEDURE — 3008F BODY MASS INDEX DOCD: CPT | Performed by: FAMILY MEDICINE

## 2024-04-03 PROCEDURE — 87880 STREP A ASSAY W/OPTIC: CPT | Performed by: FAMILY MEDICINE

## 2024-04-03 PROCEDURE — 3078F DIAST BP <80 MM HG: CPT | Performed by: FAMILY MEDICINE

## 2024-04-03 PROCEDURE — 99213 OFFICE O/P EST LOW 20 MIN: CPT | Performed by: FAMILY MEDICINE

## 2024-04-03 NOTE — PATIENT INSTRUCTIONS
Your testing will be back in 24-36 hours.    Use OTC meds for comfort as needed--  Ibuprofen/Tylenol for fever/pain  Use Benadryl at bedtime to reduce drainage and promote rest.  Zyrtec/Claritin/Allegra in the AM to reduce nasal drainage without sedation.   Use saline nasal sprays to reduce congestion and thin secretions.   Use Delsym for cough.   Consider applying lisbet's vapo-rub or eucayptus oil to chest and feet at bedtime to reduce chest and nasal congestion.   Warm tea with honey, cough lozenges, vaporizers/steam etc.    If no better in 2-3 days, follow-up with your PCP for further evaluation.

## 2024-04-03 NOTE — PROGRESS NOTES
CHIEF COMPLAINT:     Chief Complaint   Patient presents with    Sore Throat     Symptoms started yesterday : sore throat and 101 fever.   OTC: Tylenol and advi          HPI:   Kermit Willis is a 24 year old male presents to clinic with complaints of sore throat, post nasal drip. Fever to 101.  Sx started yesterday..  Reports + chills, + fever, no headache, no upset stomach, no ear pain, no rash, no diarrhea, no loss of smell/taste.    COVID exposure: none known  Sick contacts: none-- pt works with public at [employer]  COVID testing: none    Current Outpatient Medications   Medication Sig Dispense Refill    atomoxetine (STRATTERA) 10 MG Oral Cap Take 1 capsule (10 mg total) by mouth daily. Take new dose as directed 30 capsule 0    montelukast 10 MG Oral Tab Take 1 tablet (10 mg total) by mouth nightly. 90 tablet 1    albuterol (VENTOLIN HFA) 108 (90 Base) MCG/ACT Inhalation Aero Soln Inhale 2 puffs into the lungs every 4 (four) hours as needed for Wheezing or Shortness of Breath. 1 each 0      Past Medical History:   Diagnosis Date    Allergic rhinitis     Asthma (HCC)       Social History:  Social History     Socioeconomic History    Marital status: Single   Tobacco Use    Smoking status: Never    Smokeless tobacco: Never   Vaping Use    Vaping Use: Never used   Substance and Sexual Activity    Alcohol use: Never    Drug use: Never   Other Topics Concern    Caffeine Concern Yes    Exercise Yes   Social History Narrative    ** Merged History Encounter **             REVIEW OF SYSTEMS:   GENERAL HEALTH: feels well otherwise, normal appetite  SKIN: denies any unusual skin lesions or rashes  HEENT: See HPI  RESPIRATORY: denies shortness of breath or wheezing  CARDIOVASCULAR: denies chest pain or palpitations   GI: denies vomiting or diarrhea  NEURO: denies dizziness or lightheadedness    EXAM:   /66 (BP Location: Left arm, Patient Position: Sitting, Cuff Size: adult)   Pulse 84   Temp 97.5 °F (36.4 °C)  (Temporal)   Resp 16   Ht 5' 5\" (1.651 m)   Wt 150 lb (68 kg)   SpO2 95%   BMI 24.96 kg/m²   GENERAL: well developed, well nourished, in no apparent distress  SKIN: no rashes,no suspicious lesions  HEAD: atraumatic, normocephalic  EYES: conjunctiva clear  EARS: TM's clear, non-injected, no bulging, retraction, or fluid bilaterally  NOSE: nostrils patent, clear nasal mucus, nasal mucosa pink and boggy  THROAT: oral mucosa pink, moist. Posterior pharynx erythematous and cobblestoned. No exudates. Tonsils 2/4.  Uvula is midline.  Breath is not malodorous.  No trismus, hoarseness, muffled voice, or stridor.    NECK: supple  LUNGS: clear to auscultation bilaterally. Breathing is non labored.  CARDIO: RRR without murmur  EXTREMITIES: no cyanosis, clubbing or edema  LYMPH: no anterior cervical lymphadenopathy, no submandibular lymphadenopathy.  No  posterior cervical or occipital lymphadenopathy.    Recent Results (from the past 24 hour(s))   Strep A Assay W/Optic    Collection Time: 04/03/24 12:44 PM   Result Value Ref Range    Strep Grp A Screen neg Negative    Control Line Present with a clear background (yes/no) yes Yes/No    Kit Lot # [id number] Numeric    Kit Expiration Date 3/1/25 Date           ASSESSMENT AND PLAN:     Encounter Diagnoses   Name Primary?    Sore throat Yes    Encounter for screening for COVID-19        Orders Placed This Encounter   Procedures    Strep A Assay W/Optic    SARS-CoV-2 by PCR       Meds & Refills for this Visit:  Requested Prescriptions      No prescriptions requested or ordered in this encounter       Imaging & Consults:  None     Testing as above.  Comfort measures explained.   Reviewed quarantine guidelines.    Follow up with PCP in 3-5 days if not improving, condition worsens, or fever greater than or equal to 100.4 persists for 72 hours.    Verbalized understanding.    Patient Instructions   Your testing will be back in 24-36 hours.    Use OTC meds for comfort as  needed--  Ibuprofen/Tylenol for fever/pain  Use Benadryl at bedtime to reduce drainage and promote rest.  Zyrtec/Claritin/Allegra in the AM to reduce nasal drainage without sedation.   Use saline nasal sprays to reduce congestion and thin secretions.   Use Delsym for cough.   Consider applying lisbet's vapo-rub or eucayptus oil to chest and feet at bedtime to reduce chest and nasal congestion.   Warm tea with honey, cough lozenges, vaporizers/steam etc.    If no better in 2-3 days, follow-up with your PCP for further evaluation.

## 2024-04-04 LAB — SARS-COV-2 RNA RESP QL NAA+PROBE: NOT DETECTED

## 2024-04-06 RX ORDER — MONTELUKAST SODIUM 10 MG/1
10 TABLET ORAL NIGHTLY
Qty: 90 TABLET | Refills: 1 | Status: SHIPPED | OUTPATIENT
Start: 2024-04-06

## 2024-04-07 ENCOUNTER — OFFICE VISIT (OUTPATIENT)
Dept: FAMILY MEDICINE CLINIC | Facility: CLINIC | Age: 24
End: 2024-04-07
Payer: COMMERCIAL

## 2024-04-07 VITALS
BODY MASS INDEX: 24.99 KG/M2 | HEIGHT: 65 IN | OXYGEN SATURATION: 99 % | WEIGHT: 150 LBS | TEMPERATURE: 98 F | DIASTOLIC BLOOD PRESSURE: 70 MMHG | SYSTOLIC BLOOD PRESSURE: 120 MMHG | HEART RATE: 89 BPM | RESPIRATION RATE: 16 BRPM

## 2024-04-07 DIAGNOSIS — J11.1 INFLUENZA-LIKE ILLNESS: Primary | ICD-10-CM

## 2024-04-07 DIAGNOSIS — J98.01 COUGH DUE TO BRONCHOSPASM: ICD-10-CM

## 2024-04-07 PROCEDURE — 3078F DIAST BP <80 MM HG: CPT | Performed by: NURSE PRACTITIONER

## 2024-04-07 PROCEDURE — 3074F SYST BP LT 130 MM HG: CPT | Performed by: NURSE PRACTITIONER

## 2024-04-07 PROCEDURE — 3008F BODY MASS INDEX DOCD: CPT | Performed by: NURSE PRACTITIONER

## 2024-04-07 PROCEDURE — 99213 OFFICE O/P EST LOW 20 MIN: CPT | Performed by: NURSE PRACTITIONER

## 2024-04-07 RX ORDER — PREDNISONE 20 MG/1
40 TABLET ORAL DAILY
Qty: 10 TABLET | Refills: 0 | Status: SHIPPED | OUTPATIENT
Start: 2024-04-07 | End: 2024-04-12

## 2024-04-07 NOTE — PROGRESS NOTES
CHIEF COMPLAINT:     Chief Complaint   Patient presents with    Upper Respiratory Infection     X 5 day       HPI:   Kermit Willis is a 24 year old male who presents for upper respiratory symptoms for  5 days. Patient reports congestion, chest pain from coughing, wheezing. Symptoms have been unchanged since onset.  Treating symptoms with otc medication.      Current Outpatient Medications   Medication Sig Dispense Refill    predniSONE 20 MG Oral Tab Take 2 tablets (40 mg total) by mouth daily for 5 days. 10 tablet 0    montelukast 10 MG Oral Tab Take 1 tablet (10 mg total) by mouth nightly. 90 tablet 1    atomoxetine (STRATTERA) 10 MG Oral Cap Take 1 capsule (10 mg total) by mouth daily. Take new dose as directed 30 capsule 0    albuterol (VENTOLIN HFA) 108 (90 Base) MCG/ACT Inhalation Aero Soln Inhale 2 puffs into the lungs every 4 (four) hours as needed for Wheezing or Shortness of Breath. 1 each 0      Past Medical History:   Diagnosis Date    Allergic rhinitis     Asthma (HCC)       Past Surgical History:   Procedure Laterality Date    APPENDECTOMY           Social History     Socioeconomic History    Marital status: Single   Tobacco Use    Smoking status: Never    Smokeless tobacco: Never   Vaping Use    Vaping Use: Never used   Substance and Sexual Activity    Alcohol use: Never    Drug use: Never   Other Topics Concern    Caffeine Concern Yes    Exercise Yes   Social History Narrative    ** Merged History Encounter **              REVIEW OF SYSTEMS:   GENERAL: decreased appetite  SKIN: no rashes or abnormal skin lesions  HEENT: See HPI  LUNGS: See HPI  CARDIOVASCULAR: denies chest pain or palpitations   GI: denies N/V/C or abdominal pain      EXAM:   /70   Pulse 89   Temp 97.5 °F (36.4 °C)   Resp 16   Ht 5' 5\" (1.651 m)   Wt 150 lb (68 kg)   SpO2 99%   BMI 24.96 kg/m²   GENERAL: well developed, well nourished,in no apparent distress  SKIN: no rashes,no suspicious lesions  HEAD: atraumatic,  normocephalic.  no tenderness on palpation of  sinuses  EYES: conjunctiva clear, EOM intact  EARS: TM's pearly, no bulging, no retraction,no fluid, bony landmarks visible  NOSE: Nostrils patent, no nasal discharge, nasal mucosa red and inflamed   THROAT: Oral mucosa pink, moist. Posterior pharynx is not erythematous. no exudates. Tonsils 1/4.    NECK: Supple, non-tender  LUNGS: clear to auscultation bilaterally, +cough, no wheezes or rhonchi. Breathing is non labored.  CARDIO: RRR without murmur  EXTREMITIES: no cyanosis, clubbing or edema  LYMPH:  no lymphadenopathy.        ASSESSMENT AND PLAN:   Kermit Willis is a 24 year old male who presents with upper respiratory symptoms that are consistent with    ASSESSMENT:   Encounter Diagnoses   Name Primary?    Influenza-like illness Yes    Cough due to bronchospasm        PLAN: Meds as below.  Comfort care as described in Patient Instructions  Educated on viral vs bacterial  Educated on supportive measures: ibuprofen/tylenol, hydration, zyrtec, delsym for cough if needed  Educated on s/s of worsening sx and when to seek higher level of care  Follow up with pcp if not improving      Meds & Refills for this Visit:  Requested Prescriptions     Signed Prescriptions Disp Refills    predniSONE 20 MG Oral Tab 10 tablet 0     Sig: Take 2 tablets (40 mg total) by mouth daily for 5 days.     Risks, benefits, and side effects of medication explained and discussed.    The patient indicates understanding of these issues and agrees to the plan.  The patient is asked to f/u with PCP if sx's persist or worsen.  There are no Patient Instructions on file for this visit.

## 2024-05-01 ENCOUNTER — APPOINTMENT (OUTPATIENT)
Dept: GENERAL RADIOLOGY | Age: 24
End: 2024-05-01
Payer: COMMERCIAL

## 2024-05-01 ENCOUNTER — HOSPITAL ENCOUNTER (EMERGENCY)
Age: 24
Discharge: HOME | End: 2024-05-01
Attending: EMERGENCY MEDICINE
Payer: COMMERCIAL

## 2024-05-01 VITALS
BODY MASS INDEX: 24.49 KG/M2 | WEIGHT: 147 LBS | SYSTOLIC BLOOD PRESSURE: 124 MMHG | HEART RATE: 75 BPM | OXYGEN SATURATION: 97 % | DIASTOLIC BLOOD PRESSURE: 70 MMHG | TEMPERATURE: 97 F | RESPIRATION RATE: 16 BRPM | HEIGHT: 65 IN

## 2024-05-01 DIAGNOSIS — S93.601A SPRAIN OF RIGHT FOOT, INITIAL ENCOUNTER: Primary | ICD-10-CM

## 2024-05-01 PROCEDURE — 73610 X-RAY EXAM OF ANKLE: CPT

## 2024-05-01 PROCEDURE — 99284 EMERGENCY DEPT VISIT MOD MDM: CPT

## 2024-05-01 PROCEDURE — 73630 X-RAY EXAM OF FOOT: CPT

## 2024-05-01 RX ORDER — IBUPROFEN 600 MG/1
600 TABLET ORAL ONCE
Status: COMPLETED | OUTPATIENT
Start: 2024-05-01 | End: 2024-05-01

## 2024-05-02 NOTE — DISCHARGE INSTRUCTIONS
Ice, elevate.  Ibuprofen 600 mg 3 times a day with food.  Can take 2 extreme Tylenol every 6-8 hours as well.  Suggest crutches and nonweightbearing for the first 48 hours.  Weightbearing as tolerated after that fact.  If not improving, follow-up with orthopedics.  May need repeat imaging, MRI.

## 2024-05-02 NOTE — ED PROVIDER NOTES
Patient Seen in: [facility] [place]      History     Chief Complaint   Patient presents with    Leg or Foot Injury     Stated Complaint: Right ankle injury playing basketball    Subjective:   HPI    Patient is a pleasant 24-year-old who presents after rolling his ankle while playing basketball.  Stepped on another patient's foot.  Is complaining primarily of pain across the top of his right foot.  Is mild amount of ankle pain as well.  Pain with weightbearing.  Happened just prior to coming to the hospital.  Patient is otherwise at his medical baseline.    Objective:   Past Medical History:    Allergic rhinitis    Asthma (HCC)              Past Surgical History:   Procedure Laterality Date    Appendectomy                  Social History     Socioeconomic History    Marital status: Single   Tobacco Use    Smoking status: Never    Smokeless tobacco: Never   Vaping Use    Vaping status: Never Used   Substance and Sexual Activity    Alcohol use: Never    Drug use: Never   Other Topics Concern    Caffeine Concern Yes    Exercise Yes   Social History Narrative    ** Merged History Encounter **                   Review of Systems    Positive for stated complaint: Right ankle injury playing basketball  Other systems are as noted in HPI.  Constitutional and vital signs reviewed.      All other systems reviewed and negative except as noted above.    Physical Exam     ED Triage Vitals [05/01/24 2000]   /70   Pulse 75   Resp 16   Temp 97.1 °F (36.2 °C)   Temp src Temporal   SpO2 97 %   O2 Device None (Room air)       Current:/70   Pulse 75   Temp 97.1 °F (36.2 °C) (Temporal)   Resp 16   Ht 165.1 cm (5' 5\")   Wt 66.7 kg   SpO2 97%   BMI 24.46 kg/m²         Physical Exam    General: Well-appearing patient of stated age resting comfortably  HEENT: Normocephalic atraumatic.  Nonicteric sclera.  Moist mucous membranes  Lungs: No tachypnea  Cardiac: No tachycardia  Skin: No rashes,  pallor  Neuro: No focal deficits.  Extremities: Right lower extremity: There is tenderness across the dorsum of the foot.  There is no deformity.  There is no ecchymosis or significant swelling.  Mild tenderness over the fifth metatarsal.  There is mild tenderness over the lateral malleolus.    ED Course   Labs Reviewed - No data to display          Right foot x-ray: I personally reviewed the films and my independent interpertaion showed no fracture.  Official report reviewed.  No acute abnormality.    Right ankle x-rays: I personally reviewed the films and my independent interpertaion showed no fracture.  Official report reviewed and negative.         [facility]      Patient presents with right foot pain.  Inversion injury while playing basketball.  Some mild right ankle pain.  Differential includes fracture, sprain, other.  X-rays reviewed and reassuring.  Patient was treated with ibuprofen, ice.  Ace wrap, postop shoe.  Patient came with crutches.  Suggested limited weightbearing over the next 48 hours with ice elevation ibuprofen.  If improving can advance weightbearing.  If not improving should follow-up with orthopedics and consider further imaging.                                   [facility]    Disposition and Plan     Clinical Impression:  1. Sprain of right foot, initial encounter         Disposition:  Discharge  5/1/2024  8:50 pm    Follow-up:  [facility], [address]  [id number] [place] [id number] [phone]  Follow up in 2 day(s)            Medications Prescribed:  Discharge Medication List as of 5/1/2024  8:55 PM

## 2024-05-13 RX ORDER — ATOMOXETINE 10 MG/1
10 CAPSULE ORAL DAILY
Qty: 30 CAPSULE | Refills: 0 | Status: SHIPPED | OUTPATIENT
Start: 2024-05-13

## 2024-05-13 RX ORDER — MONTELUKAST SODIUM 10 MG/1
10 TABLET ORAL NIGHTLY
Qty: 90 TABLET | Refills: 1 | Status: SHIPPED | OUTPATIENT
Start: 2024-05-13

## 2024-07-21 RX ORDER — ATOMOXETINE 10 MG/1
10 CAPSULE ORAL DAILY
Qty: 30 CAPSULE | Refills: 0 | OUTPATIENT
Start: 2024-07-21

## 2025-01-09 ENCOUNTER — OFFICE VISIT (OUTPATIENT)
Dept: FAMILY MEDICINE CLINIC | Facility: CLINIC | Age: 25
End: 2025-01-09
Payer: COMMERCIAL

## 2025-01-09 VITALS
SYSTOLIC BLOOD PRESSURE: 110 MMHG | HEART RATE: 58 BPM | RESPIRATION RATE: 16 BRPM | BODY MASS INDEX: 24.66 KG/M2 | WEIGHT: 148 LBS | OXYGEN SATURATION: 99 % | TEMPERATURE: 98 F | HEIGHT: 65 IN | DIASTOLIC BLOOD PRESSURE: 64 MMHG

## 2025-01-09 DIAGNOSIS — L30.9 ECZEMA, UNSPECIFIED TYPE: Primary | ICD-10-CM

## 2025-01-09 DIAGNOSIS — L08.89 SECONDARY INFECTION OF SKIN: ICD-10-CM

## 2025-01-09 PROCEDURE — 3078F DIAST BP <80 MM HG: CPT | Performed by: NURSE PRACTITIONER

## 2025-01-09 PROCEDURE — 3008F BODY MASS INDEX DOCD: CPT | Performed by: NURSE PRACTITIONER

## 2025-01-09 PROCEDURE — 99213 OFFICE O/P EST LOW 20 MIN: CPT | Performed by: NURSE PRACTITIONER

## 2025-01-09 PROCEDURE — 3074F SYST BP LT 130 MM HG: CPT | Performed by: NURSE PRACTITIONER

## 2025-01-09 RX ORDER — TRIAMCINOLONE ACETONIDE 1 MG/G
CREAM TOPICAL 2 TIMES DAILY PRN
Qty: 60 G | Refills: 0 | Status: SHIPPED | OUTPATIENT
Start: 2025-01-09

## 2025-01-09 RX ORDER — CEPHALEXIN 500 MG/1
500 CAPSULE ORAL 4 TIMES DAILY
Qty: 28 CAPSULE | Refills: 0 | Status: SHIPPED | OUTPATIENT
Start: 2025-01-09 | End: 2025-01-16

## 2025-01-09 NOTE — PROGRESS NOTES
CHIEF COMPLAINT:     Chief Complaint   Patient presents with    Ear Problem     Yesterday, redness and itch outside ear  OTC none          HPI:    Kermit Willis is a 24 year old male who presents for evaluation of a rash.  Per patient rash started in the past 3-4 days. Rash has been worsening since onset.  Patient has had similar rash in the past. The rash is characterized by red itchy areas behind ears, redness now warm and spreading. The affected locations include behind both ers. Patient has treated rash with none.  Associated symptoms include: itching  Exposure: unknown.    Pertinent negatives include no anorexia, congestion, cough, diarrhea, eye pain, facial edema, fatigue, fever, joint pain, rhinorrhea, shortness of breath, sore throat or vomiting.      Current Outpatient Medications   Medication Sig Dispense Refill    triamcinolone 0.1 % External Cream Apply topically 2 (two) times daily as needed. 60 g 0    cephALEXin (KEFLEX) 500 MG Oral Cap Take 1 capsule (500 mg total) by mouth 4 (four) times daily for 7 days. 28 capsule 0    atomoxetine (STRATTERA) 10 MG Oral Cap Take 1 capsule (10 mg total) by mouth daily. Take new dose as directed 30 capsule 0    montelukast 10 MG Oral Tab Take 1 tablet (10 mg total) by mouth nightly. 90 tablet 1    albuterol (VENTOLIN HFA) 108 (90 Base) MCG/ACT Inhalation Aero Soln Inhale 2 puffs into the lungs every 4 (four) hours as needed for Wheezing or Shortness of Breath. 1 each 0      Past Medical History:    Allergic rhinitis    Asthma (HCC)      Past Surgical History:   Procedure Laterality Date    Appendectomy        Family History   Problem Relation Age of Onset    Heart Disorder Father         leaky aortic heart valve    No Known Problems Mother       Social History     Socioeconomic History    Marital status: Single   Tobacco Use    Smoking status: Never    Smokeless tobacco: Never   Vaping Use    Vaping status: Never Used   Substance and Sexual Activity    Alcohol  use: Never    Drug use: Never   Other Topics Concern    Caffeine Concern Yes    Exercise Yes   Social History Narrative    ** Merged History Encounter **              REVIEW OF SYSTEMS:   GENERAL: feels well otherwise, no fever, no chills.  SKIN: Per HPI. No edema. No ulcerations.  EYES: Denies blurred vision or double vision  HEENT: Denies rhinorrhea, edema of the lips or swelling of throat.  CARDIOVASCULAR: Denies chest pains or palpitations.  LUNGS: Denies shortness of breath with exertion or rest. No cough or wheezing.  LYMPH: Denies enlargement of the lymph nodes.  MUSC/SKEL: Denies joint swelling or joint stiffness.  GI: Denies abdominal pain, N/V/C/D.  NEURO: Denies abnormal sensation, tingling of the skin, or numbness.      EXAM:   /64   Pulse 58   Temp 97.5 °F (36.4 °C)   Resp 16   Ht 5' 5\" (1.651 m)   Wt 148 lb (67.1 kg)   SpO2 99%   BMI 24.63 kg/m²   GENERAL: well developed, well nourished,in no apparent distress  SKIN: bilateral posterior auricular area with dry erythematous eczematous rash with approx 3-4cm surrounding erythema and warmth as well.   EYES: PERRLA, EOMI, conjunctiva are clear  HENT: Head atraumatic, normocephalic. TM's WNL bilaterally. Normal external nose. Nasal mucosa pink without edema. No erythema of the throat. Oropharynx moist without lesions.  NECK:  Supple. Non tender.  LUNGS: Clear to auscultation bilaterally.  No wheezing, rhonchi, or rales.  No diminished breath sounds. No increased work of breathing.   CARDIO: RRR without murmur  LYMPH: No cervical/auricular lymphadenopathy.     ASSESSMENT AND PLAN:   Kermit Willis is a 24 year old male who presents for evaluation of a rash. Findings are consistent with:    ASSESSMENT:  Encounter Diagnoses   Name Primary?    Eczema, unspecified type Yes    Secondary infection of skin        PLAN: Meds as listed below.  Any worsening/spreading redness, development of facial swelling, fevers etc go to ED. Comfort measures as  described in Patient Instructions.  Skin care discussed with patient.     Meds & Refills for this Visit:  Requested Prescriptions     Signed Prescriptions Disp Refills    triamcinolone 0.1 % External Cream 60 g 0     Sig: Apply topically 2 (two) times daily as needed.    cephALEXin (KEFLEX) 500 MG Oral Cap 28 capsule 0     Sig: Take 1 capsule (500 mg total) by mouth 4 (four) times daily for 7 days.         Risk and benefits of medication discussed.  Instructions for this visit  There are no Patient Instructions on file for this visit.    The patient indicates understanding of these issues and agrees to the plan.  The patient is asked to return if sx's persist or worsen.

## 2025-01-30 ENCOUNTER — OFFICE VISIT (OUTPATIENT)
Dept: FAMILY MEDICINE CLINIC | Facility: CLINIC | Age: 25
End: 2025-01-30
Payer: COMMERCIAL

## 2025-01-30 VITALS
WEIGHT: 149 LBS | BODY MASS INDEX: 24.83 KG/M2 | HEART RATE: 89 BPM | RESPIRATION RATE: 16 BRPM | DIASTOLIC BLOOD PRESSURE: 64 MMHG | TEMPERATURE: 98 F | OXYGEN SATURATION: 98 % | HEIGHT: 65 IN | SYSTOLIC BLOOD PRESSURE: 114 MMHG

## 2025-01-30 DIAGNOSIS — L03.90 CELLULITIS, UNSPECIFIED CELLULITIS SITE: ICD-10-CM

## 2025-01-30 DIAGNOSIS — L30.9 DERMATITIS: Primary | ICD-10-CM

## 2025-01-30 PROCEDURE — 99214 OFFICE O/P EST MOD 30 MIN: CPT | Performed by: FAMILY MEDICINE

## 2025-01-30 PROCEDURE — 3078F DIAST BP <80 MM HG: CPT | Performed by: FAMILY MEDICINE

## 2025-01-30 PROCEDURE — 3074F SYST BP LT 130 MM HG: CPT | Performed by: FAMILY MEDICINE

## 2025-01-30 PROCEDURE — G2211 COMPLEX E/M VISIT ADD ON: HCPCS | Performed by: FAMILY MEDICINE

## 2025-01-30 PROCEDURE — 3008F BODY MASS INDEX DOCD: CPT | Performed by: FAMILY MEDICINE

## 2025-01-30 NOTE — PROGRESS NOTES
Subjective:   Patient ID: Kermit Willis is a 24 year old male.    SHASHI Carmen is a pleasant 24-year-old gentleman with history of ADD presenting for follow-up today after he was seen at the urgent care on 1/9/2024 for rash around his ears.  He has been prescribed triamcinolone but also with cephalexin with concerns of it being infected.  He reports that this is better but continues to have itching.  He also developed a rash behind his knees.  He has not applied triamcinolone there.  No fever no cough no chest pain no shortness of breath no nausea no vomiting no abdominal pain.      I had reviewed past medical and family histories together with allergy and medication lists documented.    History/Other:   Review of Systems   Constitutional:  Negative for fatigue and fever.   Respiratory:  Negative for cough and shortness of breath.    Cardiovascular:  Negative for chest pain.   Gastrointestinal:  Negative for abdominal pain, diarrhea, nausea and vomiting.     Current Outpatient Medications   Medication Sig Dispense Refill    triamcinolone 0.1 % External Cream Apply topically 2 (two) times daily as needed. 60 g 0    atomoxetine (STRATTERA) 10 MG Oral Cap Take 1 capsule (10 mg total) by mouth daily. Take new dose as directed 30 capsule 0    montelukast 10 MG Oral Tab Take 1 tablet (10 mg total) by mouth nightly. 90 tablet 1    albuterol (VENTOLIN HFA) 108 (90 Base) MCG/ACT Inhalation Aero Soln Inhale 2 puffs into the lungs every 4 (four) hours as needed for Wheezing or Shortness of Breath. 1 each 0     Allergies:Allergies[1]    Objective:   Physical Exam  Vitals reviewed.   Constitutional:       General: He is not in acute distress.  Skin:            Comments: Slightly erythematous rash noted on the preauricular and postauricular regions bilaterally with scaling noted.  There was also faint not erythematous patchy rash noted on the bilateral popliteal fossae.   Neurological:      Mental Status: He is alert.          Assessment & Plan:   1. Dermatitis   -Continue with triamcinolone as directed  - Avoid scratching to avoid bacterial infection   -Will refer to dermatology for further evaluation management   2. Cellulitis, unspecified cellulitis site   -Resolved  - Please see #1 for recommendations       This note was prepared using Dragon Medical voice recognition dictation software. As a result errors may occur. When identified these errors have been corrected. While every attempt is made to correct errors during dictation discrepancies may still exist            No orders of the defined types were placed in this encounter.      Meds This Visit:  Requested Prescriptions      No prescriptions requested or ordered in this encounter       Imaging & Referrals:  [facility] - INTERNAL         [1] No Known Allergies

## 2025-01-30 NOTE — PATIENT INSTRUCTIONS
Thank you for choosing Dio Leija MD at [facility]  To Do: Kermit BLACK Lazaro  1. Please see below   Call [phone] to schedule the appointment.   Please signup for [employer], which is electronic access to your record if you have not done so.  All your results will post on there.  [email or web address]   You can NOW use [employer] to book your appointments with us, or consider using open access scheduling which is through the [facility] website [email or web address] and type in Dio Leija MD and follow the links for \"Schedule Online Now\"    To schedule Imaging or tests at [facility] call Central Scheduling [phone], Go to [facility] ER Building (For example: CT scans, X rays, Ultrasound, MRI)  Cardiac Testing in ER building Building A second floor Cardiac Testing [phone] (For example: Holter Monitor, Cardiac Stress tests,Event Monitor, or 2D Echocardiograms)  [facility] call [phone] usually in [facility]  Walk in Clinic in [place] at [address] Mon-Fri at 8am-7:30 p.m., and Sat/Sun 9:00a.m.-4:30 p.m.  Also at [address] [place] [address]  Call [phone] for info     Please call our office about any questions regarding your treatment/medicines/tests as a result of today's visit.  For your safety, read the entire package insert of all medicines prescribed to you and be aware of all of the risks of treatment even beyond those discussed today.  All therapies have potential risk of harm or side effects or medication interactions.  It is your duty and for your safety to discuss with the pharmacist and our office with questions, and to notify us and stop treatment if problems arise, but know that our intention is that the benefits outweigh those potential risks and we strive to make you healthier and to improve your quality of life.    Referrals, and Radiology Information:    If your insurance requires a referral to a specialist, please allow 5 business days to process  your referral request.    If Dio Leija MD orders a CT or MRI, it may take up to 10 business days to receive approval from your insurance company. Once our office has called informing you that the insurance company approved your testing, please call Central Scheduling at [phone]  Please allow our office 5 business days to contact you regarding any testing results.    Refill policies:   Allow 3 business days for refills; controlled substances may take longer and must be picked up from the office in person.  Narcotic medications can only be filled in 30 day increments and must be refilled at an office visit only.  If your prescription is due for a refill, you may be due for a follow-up appointment.  We cannot refill your maintenance medications at a preventative wellness visit.  To best provide you care, patients receiving maintenance medications need to be seen at least twice a year.

## (undated) DEVICE — SUTURE VICRYL 3-0 SH

## (undated) DEVICE — STERILE POLYISOPRENE POWDER-FREE SURGICAL GLOVES: Brand: PROTEXIS

## (undated) DEVICE — ENDOPATH ULTRA VERESS INSUFFLATION NEEDLES WITH LUER LOCK CONNECTORS: Brand: ENDOPATH

## (undated) DEVICE — SCD SLEEVE KNEE HI BLEND

## (undated) DEVICE — TROCAR: Brand: KII® SLEEVE

## (undated) DEVICE — THE ECHELON FLEX POWERED PLUS ARTICULATING ENDOSCOPIC LINEAR CUTTERS ARE STERILE, SINGLE PATIENT USE INSTRUMENTS THAT SIMULTANEOUSLYCUT AND STAPLE TISSUE. THERE ARE SIX STAGGERED ROWS OF STAPLES, THREE ON EITHER SIDE OF THE CUT LINE. THE ECHELON FLEX 45 POWERED PLUSINSTRUMENTS HAVE A STAPLE LINE THAT IS APPROXIMATELY 45 MM LONG AND A CUT LINE THAT IS APPROXIMATELY 42 MM LONG. THE SHAFT CAN ROTATE FREELYIN BOTH DIRECTIONS AND AN ARTICULATION MECHANISM ENABLES THE DISTAL PORTION OF THE SHAFT TO PIVOT TO FACILITATE LATERAL ACCESS TO THE OPERATIVESITE.THE INSTRUMENTS ARE PACKAGED WITH A PRIMARY LITHIUM BATTERY PACK THAT MUST BE INSTALLED PRIOR TO USE. THERE ARE SPECIFIC REQUIREMENTS FORDISPOSING OF THE BATTERY PACK. REFER TO THE BATTERY PACK DISPOSAL SECTION.THE INSTRUMENTS ARE PACKAGED WITHOUT A RELOAD AND MUST BE LOADED PRIOR TO USE. A STAPLE RETAINING CAP ON THE RELOAD PROTECTS THE STAPLE LEGPOINTS DURING SHIPPING AND TRANSPORTATION. THE INSTRUMENTS’ LOCK-OUT FEATURE IS DESIGNED TO PREVENT A USED OR IMPROPERLY INSTALLED RELOADFROM BEING REFIRED OR AN INSTRUMENT FROM BEING FIRED WITHOUT A RELOAD.: Brand: ECHELON FLEX

## (undated) DEVICE — Device

## (undated) DEVICE — THE ECHELON, ECHELON ENDOPATH™ AND ECHELON FLEX™ FAMILIES OF ENDOSCOPIC LINEAR CUTTERS AND RELOADS ARE STERILE, SINGLE PATIENT USE INSTRUMENTS THAT SIMULTANEOUSLY CUT AND STAPLE TISSUE. THERE ARE SIX STAGGERED ROWS OF STAPLES, THREE ON EITHER SIDE OF THE CUT LINE. THE 45 MM INSTRUMENTS HAVE A STAPLE LINE THATIS APPROXIMATELY 45 MM LONG AND A CUT LINE THAT IS APPROXIMATELY 42 MM LONG. THE SHAFT CAN ROTATE FREELY IN BOTH DIRECTIONS AND AN ARTICULATION MECHANISM ON ARTICULATING INSTRUMENTS ENABLES BENDING THE DISTAL PORTIONOF THE SHAFT TO FACILITATE LATERAL ACCESS OF THE OPERATIVE SITE.THE INSTRUMENTS ARE SHIPPED WITHOUT A RELOAD AND MUST BE LOADED PRIOR TO USE. A STAPLE RETAINING CAP ON THE RELOAD PROTECTS THE STAPLE LEG POINTS DURING SHIPPING AND TRANSPORTATION. THE INSTRUMENTS’ LOCK-OUT FEATURE IS DESIGNED TO PREVENT A USED RELOAD FROM BEING REFIRED.: Brand: ECHELON ENDOPATH

## (undated) DEVICE — STERILE POLYISOPRENE POWDER-FREE SURGICAL GLOVES WITH EMOLLIENT COATING: Brand: PROTEXIS

## (undated) DEVICE — LIGHT HANDLE

## (undated) DEVICE — SUTURE VICRYL 0

## (undated) DEVICE — CHLORAPREP 26ML APPLICATOR

## (undated) DEVICE — ANCHOR TISSUE RETRIEVAL SYSTEM, BAG SIZE 175 ML, PORT SIZE 10 MM: Brand: ANCHOR TISSUE RETRIEVAL SYSTEM

## (undated) DEVICE — SUTURE VICRYL 5-0 PC-1

## (undated) DEVICE — TROCAR: Brand: KII SHIELDED BLADED ACCESS SYSTEM

## (undated) DEVICE — SOL  .9 1000ML BTL

## (undated) DEVICE — EXOFIN TISSUE ADHESIVE 1.0ML

## (undated) DEVICE — LAP CHOLE/APPY CDS-LF: Brand: MEDLINE INDUSTRIES, INC.

## (undated) NOTE — LETTER
Date: 10/11/2023    Patient Name: Ashley Trujillo          To Whom it may concern: This letter has been written at the patient's request. The above patient was seen at the [facility] for treatment of a medical condition. This patient should be excused from attending work today 10/11/23 due to illness. He was seen in our clinic.         Sincerely,      Miguel Angel Mcdonald PA-C

## (undated) NOTE — LETTER
04/03/24    Patient Name:  Kermit Willis        To Whom it may concern:    Kermit Willis was evaluated in the office today and should be excused from attending work today.    He may return to work on 4/4/24.      Sincerely,     Roshni Peres PA-C

## (undated) NOTE — LETTER
[address]hara Simms [address]  [address] [facility] [address] [facility]  Authorization for Surgical Operation and Procedure     Date:___________                                                                                                         Time:__________ can occur: fever and allergic reactions, hemolytic reactions, transmission of diseases such as Hepatitis, AIDS and Cytomegalovirus (CMV) and fluid overload.   In the event that I wish to have an autologous transfusion of my own blood, or a directed donor tr the applicable recovery period ends for purposes of reinstating the DNAR order.   10. Patients having a sterilization procedure: I understand that if the procedure is successful the results will be permanent and it will therefore be impossible for me to ins to give me medicine and do additional procedures as necessary. Some examples are: Starting or using an “IV” to give me medicine, fluids or blood during my procedure, and having a breathing tube placed to help me breathe when I’m asleep (intubation).  In the that rare but potential complications include headache, bleeding, infection, seizure, irregular heart rhythms, and nerve injury.     I can change my mind about having anesthesia services at any time before I get the medicine.    ____________________________

## (undated) NOTE — LETTER
10/26/2021    Return to School / Work    Name: Lena Rhodes        : 2000    To Whom It May Concern,    Lena Rhodes had surgery on 22 and is:    Able to return to school / work without restrictions on 10/26/2021.     Comments:    If the

## (undated) NOTE — LETTER
Dhara Simms [address]  [address] [facility] [address] [facility]  Authorization for Surgical Operation and Procedure     Date:___________                                                                                                         Time:__________ can occur: fever and allergic reactions, hemolytic reactions, transmission of diseases such as Hepatitis, AIDS and Cytomegalovirus (CMV) and fluid overload.   In the event that I wish to have an autologous transfusion of my own blood, or a directed donor tr the applicable recovery period ends for purposes of reinstating the DNAR order.   10. Patients having a sterilization procedure: I understand that if the procedure is successful the results will be permanent and it will therefore be impossible for me to ins to give me medicine and do additional procedures as necessary. Some examples are: Starting or using an “IV” to give me medicine, fluids or blood during my procedure, and having a breathing tube placed to help me breathe when I’m asleep (intubation).  In the that rare but potential complications include headache, bleeding, infection, seizure, irregular heart rhythms, and nerve injury.     I can change my mind about having anesthesia services at any time before I get the medicine.    ____________________________

## (undated) NOTE — LETTER
Date & Time: 5/1/2024, 9:00 PM  Patient: Kermit Willis  Encounter Provider(s):    Andrea Shah MD       To Whom It May Concern:    Kermit Willis was seen and treated in our department on 5/1/2024. He my return to work on 5/04/2024.    If you have any questions or concerns, please do not hesitate to call.        _____________________________  Physician/APC Signature